# Patient Record
Sex: MALE | Race: WHITE | Employment: UNEMPLOYED | URBAN - METROPOLITAN AREA
[De-identification: names, ages, dates, MRNs, and addresses within clinical notes are randomized per-mention and may not be internally consistent; named-entity substitution may affect disease eponyms.]

---

## 2017-06-05 ENCOUNTER — APPOINTMENT (OUTPATIENT)
Dept: CT IMAGING | Age: 52
End: 2017-06-05
Attending: INTERNAL MEDICINE
Payer: SELF-PAY

## 2017-06-05 ENCOUNTER — HOSPITAL ENCOUNTER (EMERGENCY)
Age: 52
Discharge: HOME | End: 2017-06-05
Attending: INTERNAL MEDICINE
Payer: SELF-PAY

## 2017-06-05 ENCOUNTER — APPOINTMENT (OUTPATIENT)
Dept: GENERAL RADIOLOGY | Age: 52
End: 2017-06-05
Attending: INTERNAL MEDICINE
Payer: SELF-PAY

## 2017-06-05 VITALS
SYSTOLIC BLOOD PRESSURE: 111 MMHG | OXYGEN SATURATION: 100 % | HEART RATE: 71 BPM | BODY MASS INDEX: 24.38 KG/M2 | HEIGHT: 72 IN | DIASTOLIC BLOOD PRESSURE: 66 MMHG | WEIGHT: 180 LBS | TEMPERATURE: 97.9 F | RESPIRATION RATE: 16 BRPM
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
## 2017-06-05 DIAGNOSIS — M54.2 NECK PAIN: ICD-10-CM

## 2017-06-05 DIAGNOSIS — M54.50 ACUTE MIDLINE LOW BACK PAIN WITHOUT SCIATICA: ICD-10-CM

## 2017-06-05 DIAGNOSIS — R03.0 ELEVATED BLOOD PRESSURE READING: Primary | ICD-10-CM

## 2017-06-05 PROCEDURE — L0120 CERV FLEX N/ADJ FOAM PRE OTS: HCPCS

## 2017-06-05 PROCEDURE — 77030036549 HC CLLR CERV EMT SEMI-RID PREFB S2SG -B

## 2017-06-05 PROCEDURE — 99283 EMERGENCY DEPT VISIT LOW MDM: CPT

## 2017-06-05 PROCEDURE — 74011250637 HC RX REV CODE- 250/637: Performed by: PHYSICIAN ASSISTANT

## 2017-06-05 PROCEDURE — 72125 CT NECK SPINE W/O DYE: CPT

## 2017-06-05 PROCEDURE — 72170 X-RAY EXAM OF PELVIS: CPT

## 2017-06-05 PROCEDURE — 70450 CT HEAD/BRAIN W/O DYE: CPT

## 2017-06-05 RX ORDER — IBUPROFEN 600 MG/1
600 TABLET ORAL
Qty: 20 TAB | Refills: 0 | Status: SHIPPED | OUTPATIENT
Start: 2017-06-05 | End: 2022-01-12

## 2017-06-05 RX ORDER — CYCLOBENZAPRINE HCL 10 MG
10 TABLET ORAL
Qty: 15 TAB | Refills: 0 | OUTPATIENT
Start: 2017-06-05 | End: 2017-06-10

## 2017-06-05 RX ORDER — OXYCODONE AND ACETAMINOPHEN 5; 325 MG/1; MG/1
1 TABLET ORAL
Status: COMPLETED | OUTPATIENT
Start: 2017-06-05 | End: 2017-06-05

## 2017-06-05 RX ORDER — CYCLOBENZAPRINE HCL 10 MG
10 TABLET ORAL
Qty: 10 TAB | Refills: 0 | Status: SHIPPED | OUTPATIENT
Start: 2017-06-05 | End: 2022-02-25 | Stop reason: SDUPTHER

## 2017-06-05 RX ORDER — NAPROXEN 500 MG/1
500 TABLET ORAL 2 TIMES DAILY WITH MEALS
Qty: 14 TAB | Refills: 0 | OUTPATIENT
Start: 2017-06-05 | End: 2017-06-12

## 2017-06-05 RX ADMIN — OXYCODONE HYDROCHLORIDE AND ACETAMINOPHEN 1 TABLET: 5; 325 TABLET ORAL at 22:47

## 2017-06-06 NOTE — ED NOTES
Per Edward Johsn, order patient one PO percocet if patient has ride home. Patient stating he is not driving, he was dropped off by his neighbor who will take him home.

## 2017-06-06 NOTE — ED PROVIDER NOTES
HPI Comments: 8:20 PM Irene Salcedo is a 50 y.o. male who presents to the ED for evaluation post fall. Pt states that he was walking up his stairs when he slipped backwards and landed on his back. Pt c/o L sided lower back pain, neck pain, and HA. Pt denies LOC. Pt has no other sx or complaints. Currently denies of any SOB, chest pain, abd pain, numbness or tingling to lower ext. Past Medical History:   Diagnosis Date    Hypertension        Social History     Social History    Marital status:      Spouse name: N/A    Number of children: N/A    Years of education: N/A     Occupational History    Not on file. Social History Main Topics    Smoking status: Current Every Day Smoker     Packs/day: 1.00     Years: 30.00    Smokeless tobacco: Not on file    Alcohol use Yes    Drug use: No    Sexual activity: Not on file     Other Topics Concern    Not on file     Social History Narrative         ALLERGIES: Review of patient's allergies indicates no known allergies. Review of Systems   Constitutional: Negative for chills and fever. HENT: Negative for ear discharge, ear pain, facial swelling and hearing loss. Respiratory: Negative for cough, choking, chest tightness, shortness of breath and wheezing. Cardiovascular: Negative for chest pain, palpitations and leg swelling. Gastrointestinal: Negative for abdominal distention, abdominal pain, diarrhea, nausea, rectal pain and vomiting. Musculoskeletal: Positive for back pain and neck pain. Negative for arthralgias. Vitals:    06/05/17 2021 06/05/17 2236   BP: (!) 145/105 111/66   Pulse: 74 71   Resp: 12 16   Temp: 97.9 °F (36.6 °C)    SpO2: 96% 100%   Weight: 81.6 kg (180 lb)    Height: 6' (1.829 m)             Physical Exam   Constitutional: He is oriented to person, place, and time. He appears well-developed and well-nourished. No distress. HENT:   Head: Normocephalic and atraumatic.    Eyes: Conjunctivae and EOM are normal. Pupils are equal, round, and reactive to light. Neck:   Currently on C collar,   TTP around the cervical region and also with axial load. Cardiovascular: Normal rate and regular rhythm. Pulmonary/Chest: Effort normal and breath sounds normal.   Abdominal: Soft. Bowel sounds are normal.   Musculoskeletal:   SLR 30 * B/L  DTR + 2 B/L       Able to hyperextend finger on both hand against resistance.  intact b/l   Able to extend and flex wrist against resistance on both side. Neurological: He is alert and oriented to person, place, and time. Skin: Skin is warm. He is not diaphoretic. Psychiatric: He has a normal mood and affect. His behavior is normal. Judgment and thought content normal.        MDM  Number of Diagnoses or Management Options  Elevated blood pressure reading:   Diagnosis management comments: Pending X ray and CT official read. ED Course       Procedures    CLINICAL IMPRESSION:    1. Elevated blood pressure reading        11:21 PM REPORT GIVEN TO Dr Kelsey Fagan.

## 2017-06-06 NOTE — ED TRIAGE NOTES
Patient states he missed a step and fell on his butt and his head whipped back and c/o neck, head and left buttocks pain.

## 2017-06-06 NOTE — ED NOTES
Fall with left buttock pain; right paraspinal neck pain; occipital pain.  Will order x rays and fast track

## 2017-06-06 NOTE — ED NOTES
Pt stating he is still having significant pain in neck. Stating he feels \"stiff\" requesting pain medication at this time.  Oswald LANGFORD

## 2017-06-06 NOTE — DISCHARGE INSTRUCTIONS
Back Pain: Care Instructions  Your Care Instructions    Back pain has many possible causes. It is often related to problems with muscles and ligaments of the back. It may also be related to problems with the nerves, discs, or bones of the back. Moving, lifting, standing, sitting, or sleeping in an awkward way can strain the back. Sometimes you don't notice the injury until later. Arthritis is another common cause of back pain. Although it may hurt a lot, back pain usually improves on its own within several weeks. Most people recover in 12 weeks or less. Using good home treatment and being careful not to stress your back can help you feel better sooner. Follow-up care is a key part of your treatment and safety. Be sure to make and go to all appointments, and call your doctor if you are having problems. Its also a good idea to know your test results and keep a list of the medicines you take. How can you care for yourself at home? · Sit or lie in positions that are most comfortable and reduce your pain. Try one of these positions when you lie down:  ¨ Lie on your back with your knees bent and supported by large pillows. ¨ Lie on the floor with your legs on the seat of a sofa or chair. [place] on your side with your knees and hips bent and a pillow between your legs. ¨ Lie on your stomach if it does not make pain worse. · Do not sit up in bed, and avoid soft couches and twisted positions. Bed rest can help relieve pain at first, but it delays healing. Avoid bed rest after the first day of back pain. · Change positions every 30 minutes. If you must sit for long periods of time, take breaks from sitting. Get up and walk around, or lie in a comfortable position. · Try using a heating pad on a low or medium setting for 15 to 20 minutes every 2 or 3 hours. Try a warm shower in place of one session with the heating pad. · You can also try an ice pack for 10 to 15 minutes every 2 to 3 hours.  Put a thin cloth between the ice pack and your skin. · Take pain medicines exactly as directed. ¨ If the doctor gave you a prescription medicine for pain, take it as prescribed. ¨ If you are not taking a prescription pain medicine, ask your doctor if you can take an over-the-counter medicine. · Take short walks several times a day. You can start with 5 to 10 minutes, 3 or 4 times a day, and work up to longer walks. Walk on level surfaces and avoid hills and stairs until your back is better. · Return to work and other activities as soon as you can. Continued rest without activity is usually not good for your back. · To prevent future back pain, do exercises to stretch and strengthen your back and stomach. Learn how to use good posture, safe lifting techniques, and proper body mechanics. When should you call for help? Call your doctor now or seek immediate medical care if:  · You have new or worsening numbness in your legs. · You have new or worsening weakness in your legs. (This could make it hard to stand up.)  · You lose control of your bladder or bowels. Watch closely for changes in your health, and be sure to contact your doctor if:  · Your pain gets worse. · You are not getting better after 2 weeks. Where can you learn more? Go to http://allyssa-taylor.info/. Enter Y258 in the search box to learn more about \"Back Pain: Care Instructions. \"  Current as of: May 23, 2016  Content Version: 11.2  © 6471-4750 [employer]. Care instructions adapted under license by [employer] (which disclaims liability or warranty for this information). If you have questions about a medical condition or this instruction, always ask your healthcare professional. [place] [address] any warranty or liability for your use of this information.          Neck Pain: Care Instructions  Your Care Instructions  You can have neck pain anywhere from the bottom of your head to the top of your shoulders. It can spread to the upper back or arms. Injuries, painting a ceiling, sleeping with your neck twisted, staying in one position for too long, and many other activities can cause neck pain. Most neck pain gets better with home care. Your doctor may recommend medicine to relieve pain or relax your muscles. He or she may suggest exercise and physical therapy to increase flexibility and relieve stress. You may need to wear a special (cervical) collar to support your neck for a day or two. Follow-up care is a key part of your treatment and safety. Be sure to make and go to all appointments, and call your doctor if you are having problems. It's also a good idea to know your test results and keep a list of the medicines you take. How can you care for yourself at home? · Try using a heating pad on a low or medium setting for 15 to 20 minutes every 2 or 3 hours. Try a warm shower in place of one session with the heating pad. · You can also try an ice pack for 10 to 15 minutes every 2 to 3 hours. Put a thin cloth between the ice and your skin. · Take pain medicines exactly as directed. ¨ If the doctor gave you a prescription medicine for pain, take it as prescribed. ¨ If you are not taking a prescription pain medicine, ask your doctor if you can take an over-the-counter medicine. · If your doctor recommends a cervical collar, wear it exactly as directed. When should you call for help? Call your doctor now or seek immediate medical care if:  · You have new or worsening numbness in your arms, buttocks or legs. · You have new or worsening weakness in your arms or legs. (This could make it hard to stand up.)  · You lose control of your bladder or bowels. Watch closely for changes in your health, and be sure to contact your doctor if:  · Your neck pain is getting worse. · You are not getting better after 1 week. · You do not get better as expected. Where can you learn more?   Go to http://linda.info/. Enter [id number] in the search box to learn more about \"Neck Pain: Care Instructions. \"  Current as of: May 23, 2016  Content Version: 11.2  © 5597-3790 [employer]. Care instructions adapted under license by [employer] (which disclaims liability or warranty for this information). If you have questions about a medical condition or this instruction, always ask your healthcare professional. [address] any warranty or liability for your use of this information. Elevated Blood Pressure: Care Instructions  Your Care Instructions    Blood pressure is a measure of how hard the blood pushes against the walls of your arteries. It's normal for blood pressure to go up and down throughout the day. But if it stays up over time, you have high blood pressure. Two numbers tell you your blood pressure. The first number is the systolic pressure. It shows how hard the blood pushes when your heart is pumping. The second number is the diastolic pressure. It shows how hard the blood pushes between heartbeats, when your heart is relaxed and filling with blood. An ideal blood pressure in adults is less than 120/80 (say \"120 over 80\"). High blood pressure is 140/90 or higher. You have high blood pressure if your top number is 140 or higher or your bottom number is 90 or higher, or both. The main test for high blood pressure is simple, fast, and painless. To diagnose high blood pressure, your doctor will test your blood pressure at different times. After testing your blood pressure, your doctor may ask you to test it again when you are home. If you are diagnosed with high blood pressure, you can work with your doctor to make a long-term plan to manage it. Follow-up care is a key part of your treatment and safety. Be sure to make and go to all appointments, and call your doctor if you are having problems.  It's also a good idea to know your test results and keep a list of the medicines you take. How can you care for yourself at home? · Do not smoke. Smoking increases your risk for heart attack and stroke. If you need help quitting, talk to your doctor about stop-smoking programs and medicines. These can increase your chances of quitting for good. · Stay at a healthy weight. · Try to limit how much sodium you eat to less than 2,300 milligrams (mg) a day. Your doctor may ask you to try to eat less than 1,500 mg a day. · Be physically active. Get at least 30 minutes of exercise on most days of the week. Walking is a good choice. You also may want to do other activities, such as running, swimming, cycling, or playing tennis or team sports. · Avoid or limit alcohol. Talk to your doctor about whether you can drink any alcohol. · Eat plenty of fruits, vegetables, and low-fat dairy products. Eat less saturated and total fats. · Learn how to check your blood pressure at home. When should you call for help? Call your doctor now or seek immediate medical care if:  · Your blood pressure is much higher than normal (such as 180/110 or higher). · You think high blood pressure is causing symptoms such as:  ¨ Severe headache. ¨ Blurry vision. Watch closely for changes in your health, and be sure to contact your doctor if:  · You do not get better as expected. Where can you learn more? Go to http://allyssa-taylor.info/. Enter P683 in the search box to learn more about \"Elevated Blood Pressure: Care Instructions. \"  Current as of: October 19, 2016  Content Version: 11.2  © 6339-7935 [employer]. Care instructions adapted under license by [employer] (which disclaims liability or warranty for this information). If you have questions about a medical condition or this instruction, always ask your healthcare professional. [address] any warranty or liability for your use of this information.

## 2021-12-07 ENCOUNTER — OFFICE VISIT (OUTPATIENT)
Dept: FAMILY MEDICINE CLINIC | Age: 56
End: 2021-12-07
Payer: MEDICAID

## 2021-12-07 VITALS
HEART RATE: 110 BPM | TEMPERATURE: 98.5 F | BODY MASS INDEX: 22.67 KG/M2 | SYSTOLIC BLOOD PRESSURE: 104 MMHG | OXYGEN SATURATION: 94 % | RESPIRATION RATE: 16 BRPM | HEIGHT: 72 IN | DIASTOLIC BLOOD PRESSURE: 72 MMHG | WEIGHT: 167.4 LBS

## 2021-12-07 DIAGNOSIS — D64.9 ANEMIA, UNSPECIFIED TYPE: ICD-10-CM

## 2021-12-07 DIAGNOSIS — I49.9 IRREGULAR HEART BEAT: ICD-10-CM

## 2021-12-07 DIAGNOSIS — Z76.89 ENCOUNTER TO ESTABLISH CARE: ICD-10-CM

## 2021-12-07 DIAGNOSIS — K70.31 ALCOHOLIC CIRRHOSIS OF LIVER WITH ASCITES (HCC): Primary | ICD-10-CM

## 2021-12-07 PROCEDURE — 99204 OFFICE O/P NEW MOD 45 MIN: CPT | Performed by: STUDENT IN AN ORGANIZED HEALTH CARE EDUCATION/TRAINING PROGRAM

## 2021-12-07 RX ORDER — LACTULOSE 10 G/15ML
15 SOLUTION ORAL; RECTAL 3 TIMES DAILY
COMMUNITY
Start: 2021-11-14

## 2021-12-07 RX ORDER — CIPROFLOXACIN 500 MG/1
500 TABLET ORAL 2 TIMES DAILY
COMMUNITY
End: 2022-01-12

## 2021-12-07 RX ORDER — LANOLIN ALCOHOL/MO/W.PET/CERES
100 CREAM (GRAM) TOPICAL DAILY
COMMUNITY
Start: 2021-10-11 | End: 2022-01-27

## 2021-12-07 RX ORDER — SPIRONOLACTONE 100 MG/1
1 TABLET, FILM COATED ORAL DAILY
COMMUNITY
Start: 2021-10-27 | End: 2022-02-25 | Stop reason: SDUPTHER

## 2021-12-07 RX ORDER — ONDANSETRON 4 MG/1
TABLET, ORALLY DISINTEGRATING ORAL
COMMUNITY
Start: 2021-11-19

## 2021-12-07 RX ORDER — MULTIVITAMIN WITH FOLIC ACID 400 MCG
1 TABLET ORAL DAILY
COMMUNITY
Start: 2021-10-27 | End: 2022-01-12 | Stop reason: SDUPTHER

## 2021-12-07 RX ORDER — FUROSEMIDE 40 MG/1
40 TABLET ORAL DAILY
Qty: 30 TABLET | Refills: 0 | Status: SHIPPED | OUTPATIENT
Start: 2021-12-07 | End: 2022-02-16

## 2021-12-07 RX ORDER — FUROSEMIDE 40 MG/1
40 TABLET ORAL DAILY
COMMUNITY
End: 2021-12-07 | Stop reason: SDUPTHER

## 2021-12-07 RX ORDER — PANTOPRAZOLE SODIUM 40 MG/1
40 TABLET, DELAYED RELEASE ORAL DAILY
COMMUNITY
Start: 2021-11-14 | End: 2022-01-27

## 2021-12-07 NOTE — PROGRESS NOTES
Radha Elias is a 49 y.o.  male and presents with    Chief Complaint   Patient presents with    Establish Care           Subjective:    Pt is here to establish care. Alcohol drinker for about >30yrs. After abdominal swelling and bowel issues pt was admitted to [facility] on 10/6/21 w/ dx of decompensated alcoholic cirrhosis of liver with ascites - required several parencentesis   d/c on 10/21 - Went to [facility] from [facility] and got d/c on 10/28. Pt states that he is following with hepatologist Dr. Tk Angela at [facility]. He will be seeing him next on 1/26/22 for screening of possible liver tx. Called Dr. Tk Angela office at [facility] and spoke to his nurse. They were unaware that pt was on hospice. They state patient should be on Lasix, Spironolactone, Lactulose, PPI. Pt had then a new hospitalization at [facility] on 11/10/21 after having blood work done that showed hyperkalemia and elevated Cr. Admitted w/ [facility] likely d/t pre renal state, diuretic use, and possible hepatorenal syndrome. Prior to last d/c pt was - started hospice for liver cirrhosis and what it looked like a poor overall prognosis. Also, he was told to stop spironolactone at that time. Pt is taking Lactulose, Lasix, PPI, MVI, oxycodone, and lorazepam.    Hospice - [facility] - nurse comes every [phone]      There is no problem list on file for this patient. Past Medical History:   Diagnosis Date    Hypertension       No past surgical history on file. No family history on file.   Social History     Socioeconomic History    Marital status:      Spouse name: Not on file    Number of children: Not on file    Years of education: Not on file    Highest education level: Not on file   Occupational History    Not on file   Tobacco Use    Smoking status: Current Every Day Smoker     Packs/day: 1.00     Years: 30.00     Pack years: 30.00     Types: Cigarettes    Smokeless tobacco: Not on file   Substance and Sexual Activity  Alcohol use: Not Currently    Drug use: No    Sexual activity: Not on file   Other Topics Concern    Not on file   Social History Narrative    Not on file     Social Determinants of Health     Financial Resource Strain:     Difficulty of Paying Living Expenses: Not on file   Food Insecurity:     Worried About Running Out of Food in the Last Year: Not on file    [place] of Food in the Last Year: Not on file   Transportation Needs:     Lack of Transportation (Medical): Not on file    Lack of Transportation (Non-Medical): Not on file   Physical Activity:     Days of Exercise per Week: Not on file    Minutes of Exercise per Session: Not on file   Stress:     Feeling of Stress : Not on file   Social Connections:     Frequency of Communication with Friends and Family: Not on file    Frequency of Social Gatherings with Friends and Family: Not on file    Attends [employer] Services: Not on file    Active Member of [address] [place] [address] or Organizations: Not on file    Attends Club or Organization Meetings: Not on file    Marital Status: Not on file   Intimate Partner Violence:     Fear of Current or Ex-Partner: Not on file    Emotionally Abused: Not on file    Physically Abused: Not on file    Sexually Abused: Not on file   Housing Stability:     Unable to Pay for Housing in the Last Year: Not on file    Number of [place] in the Last Year: Not on file    Unstable Housing in the Last Year: Not on file        Current Outpatient Medications   Medication Sig Dispense Refill    ciprofloxacin HCl (Cipro) 500 mg tablet Take 500 mg by mouth two (2) times a day.  furosemide (LASIX) 40 mg tablet Take 40 mg by mouth daily.  cyclobenzaprine (FLEXERIL) 10 mg tablet Take 1 Tab by mouth three (3) times daily as needed for Muscle Spasm(s). (Patient not taking: Reported on 12/7/2021) 10 Tab 0    ibuprofen (MOTRIN) 600 mg tablet Take 1 Tab by mouth every six (6) hours as needed for Pain.  (Patient not taking: Reported on 12/7/2021) 20 Tab 0        ROS   Review of Systems   Constitutional: Positive for malaise/fatigue. Negative for chills and fever. HENT: Negative for congestion, ear discharge and ear pain. Eyes: Negative for blurred vision, pain and discharge. Respiratory: Negative for cough and shortness of breath. Cardiovascular: Negative for chest pain and palpitations. Gastrointestinal: Negative for abdominal pain, nausea and vomiting. Genitourinary: Negative for dysuria, frequency and urgency. Musculoskeletal: Positive for joint pain. Skin: Negative for itching and rash. Neurological: Negative for dizziness, seizures, loss of consciousness and headaches. Psychiatric/Behavioral: Negative for substance abuse. Objective:  Vitals:    12/07/21 0901   BP: 104/72   Pulse: (!) 110   Resp: 16   Temp: 98.5 °F (36.9 °C)   TempSrc: Temporal   SpO2: 94%   Weight: 167 lb 6.4 oz (75.9 kg)   Height: 6' (1.829 m)   PainSc:   7       Physical Exam  Vitals reviewed. Constitutional:       General: He is not in acute distress. Appearance: He is underweight. He is not ill-appearing. Comments: Pt is in a wheelchair. HENT:      Nose: Nose normal. No congestion or rhinorrhea. Eyes:      General: Scleral icterus present. Right eye: No discharge. Left eye: No discharge. Conjunctiva/sclera: Conjunctivae normal.   Cardiovascular:      Rate and Rhythm: Normal rate. Rhythm irregular. Pulses: Normal pulses. Pulmonary:      Effort: Pulmonary effort is normal.   Abdominal:      General: There is distension. Tenderness: There is no abdominal tenderness. Musculoskeletal:      Cervical back: Normal range of motion and neck supple. Skin:     Coloration: Skin is jaundiced. Neurological:      Mental Status: He is alert and oriented to person, place, and time. Psychiatric:         Speech: Speech is delayed. Behavior: Behavior is slowed. Behavior is cooperative. Thought Content: Thought content normal.         Judgment: Judgment normal.           LABS     TESTS      Assessment/Plan:    1. Alcoholic cirrhosis of liver with ascites (HCC)  - furosemide (LASIX) 40 mg tablet; Take 1 Tablet by mouth daily. Dispense: 30 Tablet; Refill: 0  - CBC WITH AUTOMATED DIFF; Future  - METABOLIC PANEL, COMPREHENSIVE; Future  - PTT; Future    2. Encounter to establish care  will be pt PCP    3. Anemia, unspecified type  2/2 cirrhosis    4. Irregular heart beat  - EKG, 12 LEAD, INITIAL; Future    Lab review: orders written for new lab studies as appropriate; see orders      I have discussed the diagnosis with the patient and the intended plan as seen in the above orders. The patient has received an after-visit summary and questions were answered concerning future plans. I have discussed medication side effects and warnings with the patient as well. I have reviewed the plan of care with the patient, accepted their input and they are in agreement with the treatment goals.          Benito Holstein, MD

## 2021-12-07 NOTE — PROGRESS NOTES
Nichelle Berrios is a 49 y.o. male (: 1965) presenting to address:    Chief Complaint   Patient presents with   [facility].   Patient DECLINED flu vaccine    There were no vitals filed for this visit. Hearing/Vision:   No exam data present    Learning Assessment:   No flowsheet data found. Depression Screening:     3 most recent PHQ Screens 2021   Little interest or pleasure in doing things More than half the days   Feeling down, depressed, irritable, or hopeless Nearly every day   Total Score PHQ 2 5   Trouble falling or staying asleep, or sleeping too much Not at all     Fall Risk Assessment:     Fall Risk Assessment, last 12 mths 2021   Able to walk? Yes   Fall in past 12 months? 0   Do you feel unsteady? 1   Are you worried about falling 1   Is TUG test greater than 12 seconds? 0   Is the gait abnormal? 1     Abuse Screening:   No flowsheet data found. ADL Assessment:   No flowsheet data found. Coordination of Care Questionaire:   1. Have you been to the ER, urgent care clinic since your last visit? Hospitalized since your last visit? NO    2. Have you seen or consulted any other health care providers outside of the [address] [place] [address] since your last visit? Include any pap smears or colon screening. NO    Advanced Directive:   1. Do you have an Advanced Directive? NO    2. Would you like information on Advanced Directives?  NO

## 2022-01-12 ENCOUNTER — OFFICE VISIT (OUTPATIENT)
Dept: FAMILY MEDICINE CLINIC | Age: 57
End: 2022-01-12
Payer: MEDICAID

## 2022-01-12 VITALS
HEIGHT: 72 IN | OXYGEN SATURATION: 97 % | HEART RATE: 95 BPM | SYSTOLIC BLOOD PRESSURE: 108 MMHG | TEMPERATURE: 99.1 F | WEIGHT: 153.6 LBS | DIASTOLIC BLOOD PRESSURE: 78 MMHG | RESPIRATION RATE: 16 BRPM | BODY MASS INDEX: 20.8 KG/M2

## 2022-01-12 DIAGNOSIS — L89.41 PRESSURE INJURY OF CONTIGUOUS REGION INVOLVING BUTTOCK AND HIP, STAGE 1, UNSPECIFIED LATERALITY: Primary | ICD-10-CM

## 2022-01-12 DIAGNOSIS — R53.1 WEAKNESS GENERALIZED: ICD-10-CM

## 2022-01-12 DIAGNOSIS — F32.1 CURRENT MODERATE EPISODE OF MAJOR DEPRESSIVE DISORDER WITHOUT PRIOR EPISODE (HCC): ICD-10-CM

## 2022-01-12 DIAGNOSIS — K70.31 ALCOHOLIC CIRRHOSIS OF LIVER WITH ASCITES (HCC): ICD-10-CM

## 2022-01-12 PROCEDURE — 99214 OFFICE O/P EST MOD 30 MIN: CPT | Performed by: STUDENT IN AN ORGANIZED HEALTH CARE EDUCATION/TRAINING PROGRAM

## 2022-01-12 RX ORDER — OSTOMY SUPPLY 1"
1 EACH MISCELLANEOUS DAILY
Qty: 10 EACH | Refills: 1 | Status: SHIPPED | OUTPATIENT
Start: 2022-01-12

## 2022-01-12 RX ORDER — ZINC OXIDE 200 MG/G
OINTMENT TOPICAL AS NEEDED
Qty: 60 G | Refills: 0 | Status: SHIPPED | OUTPATIENT
Start: 2022-01-12

## 2022-01-12 RX ORDER — SENNOSIDES/DOCUSATE SODIUM 8.6MG-50MG
TABLET ORAL
COMMUNITY
Start: 2022-01-07

## 2022-01-12 RX ORDER — ESCITALOPRAM OXALATE 5 MG/1
5 TABLET ORAL DAILY
Qty: 30 TABLET | Refills: 0 | Status: SHIPPED | OUTPATIENT
Start: 2022-01-12 | End: 2022-02-10

## 2022-01-12 RX ORDER — MULTIVITAMIN WITH FOLIC ACID 400 MCG
1 TABLET ORAL DAILY
Qty: 90 TABLET | Refills: 1 | Status: SHIPPED | OUTPATIENT
Start: 2022-01-12

## 2022-01-12 NOTE — PROGRESS NOTES
Patient is accompanied by Mainor Marley, roommate. Patient assisted by wheelchair. Chief Complaint   Patient presents with    Follow-up    Weight Loss     1. Have you been to the ER, urgent care clinic since your last visit? Hospitalized since your last visit? Yes SN[facility] last month for paracentesis     2. Have you seen or consulted any other health care providers outside of the [address] [place] [address] since your last visit? Include any pap smears or colon screening.  No    Health Maintenance Due   Topic Date Due    Hepatitis C Screening  Never done    COVID-19 Vaccine (1) Never done    Pneumococcal 0-64 years (1 of 2 - PPSV23) Never done    Lipid Screen  Never done    Colorectal Cancer Screening Combo  Never done    Shingrix Vaccine Age 50> (1 of 2) Never done    Low dose CT lung screening  Never done    Flu Vaccine (1) Never done

## 2022-01-14 NOTE — PROGRESS NOTES
Vernon Anderson is a 49 y.o.  male and presents with    Chief Complaint   Patient presents with    Follow-up    Weight Loss           Subjective:    Pt is in hospice. Recently had a paracentesis where he had 5L removed. Pt states that he is not feeling like he wants to eat. He gets cravings for food but when he goes to eat it he only wants a few bites. Feels like he has generalized weakness and sometimes is not even strong enough to get up from his chair. States he also is starting to have a wound on his sacrum. His roommates have been trying to patch it up so this does not become a sore. He states hospice has not come around and seen this yet, but they will be there tomorrow. Upon further discussion with pt he does feel like he is starting to get further depressed. He does not like the fact that he cannot do things that he used to. Pt states he has upcoming apt w/ GI in [place] for colonoscopy. There is no problem list on file for this patient. Past Medical History:   Diagnosis Date    Hypertension       History reviewed. No pertinent surgical history. History reviewed. No pertinent family history.   Social History     Socioeconomic History    Marital status:      Spouse name: Not on file    Number of children: Not on file    Years of education: Not on file    Highest education level: Not on file   Occupational History    Not on file   Tobacco Use    Smoking status: Current Every Day Smoker     Packs/day: 1.00     Years: 30.00     Pack years: 30.00     Types: Cigarettes    Smokeless tobacco: Never Used   Vaping Use    Vaping Use: Never used   Substance and Sexual Activity    Alcohol use: Not Currently    Drug use: No    Sexual activity: Not on file   Other Topics Concern    Not on file   Social History Narrative    Not on file     Social Determinants of Health     Financial Resource Strain:     Difficulty of Paying Living Expenses: Not on file   Food Insecurity:     Worried About Running Out of Food in the Last Year: Not on file    [place] of Food in the Last Year: Not on file   Transportation Needs:     Lack of Transportation (Medical): Not on file    Lack of Transportation (Non-Medical): Not on file   Physical Activity:     Days of Exercise per Week: Not on file    Minutes of Exercise per Session: Not on file   Stress:     Feeling of Stress : Not on file   Social Connections:     Frequency of Communication with Friends and Family: Not on file    Frequency of Social Gatherings with Friends and Family: Not on file    Attends Anabaptism Services: Not on file    Active Member of [address] [place] [address] [employer] or Organizations: Not on file    Attends Club or Organization Meetings: Not on file    Marital Status: Not on file   Intimate Partner Violence:     Fear of Current or Ex-Partner: Not on file    Emotionally Abused: Not on file    Physically Abused: Not on file    Sexually Abused: Not on file   Housing Stability:     Unable to Pay for Housing in the Last Year: Not on file    Number of [place] in the Last Year: Not on file    Unstable Housing in the Last Year: Not on file        Current Outpatient Medications   Medication Sig Dispense Refill    Stool Softener-Laxative 8.6-50 mg per tablet TAKE ONE TO TWO TABLETS BY MOUTH AS NEEDED FOR CONSTIPATION      liver oil-zinc oxide ointment Apply  to affected area as needed for Skin Irritation. 60 g 0    Alginate Dressing (Kaltostat Wound Dressing) 6 X 9 1/2 \" bndg 1 Each by Apply Externally route daily. 10 Each 1    Daily-Teressa, with folic acid, 081 mcg tab tablet Take 1 Tablet by mouth daily. 90 Tablet 1    escitalopram oxalate (LEXAPRO) 5 mg tablet Take 1 Tablet by mouth daily. 30 Tablet 0    lactulose (CHRONULAC) 10 gram/15 mL solution Take 15 mL by mouth three (3) times daily.       ondansetron (ZOFRAN ODT) 4 mg disintegrating tablet USE 1 TABLET UNDER TONGUE EVERY 8 HOURS AS NEEDED FOR NAUSEA      pantoprazole (PROTONIX) 40 mg tablet Take 40 mg by mouth daily.  spironolactone (ALDACTONE) 100 mg tablet Take 1 Tablet by mouth daily.  thiamine HCL (B-1) 100 mg tablet Take 100 mg by mouth daily.  furosemide (LASIX) 40 mg tablet Take 1 Tablet by mouth daily. 30 Tablet 0    cyclobenzaprine (FLEXERIL) 10 mg tablet Take 1 Tab by mouth three (3) times daily as needed for Muscle Spasm(s). 10 Tab 0        ROS   Review of Systems   Constitutional: Positive for malaise/fatigue and weight loss. Negative for chills and fever. HENT: Negative for congestion, ear discharge and ear pain. Eyes: Negative for blurred vision, pain and discharge. Respiratory: Negative for cough and shortness of breath. Cardiovascular: Negative for chest pain and palpitations. Gastrointestinal: Negative for abdominal pain, nausea and vomiting. Genitourinary: Negative for dysuria, frequency and urgency. Skin: Negative for itching and rash. Neurological: Positive for weakness. Negative for dizziness, seizures, loss of consciousness and headaches. Psychiatric/Behavioral: Positive for depression. Negative for substance abuse. Objective:  Vitals:    01/12/22 1520   BP: 108/78   Pulse: 95   Resp: 16   Temp: 99.1 °F (37.3 °C)   TempSrc: Temporal   SpO2: 97%   Weight: 153 lb 9.6 oz (69.7 kg)   Height: 6' (1.829 m)   PainSc:   0 - No pain       Physical Exam  Constitutional:       General: He is not in acute distress. Appearance: Normal appearance. He is not ill-appearing. HENT:      Nose: Nose normal. No congestion or rhinorrhea. Eyes:      General:         Right eye: No discharge. Left eye: No discharge. Conjunctiva/sclera: Conjunctivae normal.   Cardiovascular:      Rate and Rhythm: Normal rate and regular rhythm. Pulses: Normal pulses. Pulmonary:      Effort: Pulmonary effort is normal.      Breath sounds: Normal breath sounds.    Musculoskeletal:      Cervical back: Normal range of motion and neck supple. Skin:     Coloration: Skin is jaundiced and pale. Neurological:      Mental Status: He is alert and oriented to person, place, and time. Psychiatric:         Mood and Affect: Mood is depressed. Affect is tearful. Behavior: Behavior normal.         Judgment: Judgment normal.           LABS     TESTS      Assessment/Plan:    1. Pressure injury of contiguous region involving buttock and hip, stage 1, unspecified laterality  - liver oil-zinc oxide ointment; Apply  to affected area as needed for Skin Irritation. Dispense: 60 g; Refill: 0  - Alginate Dressing (Kaltostat Wound Dressing) 6 X 9 1/2 \" bndg; 1 Each by Apply Externally route daily. Dispense: 10 Each; Refill: 1    2. Alcoholic cirrhosis of liver with ascites ([address]  Will get nutrition so that pt gets the right nutrition that he requires  Denies any alcohol drinking  - REFERRAL TO NUTRITION  - Daily-Teressa, with folic acid, 440 mcg tab tablet; Take 1 Tablet by mouth daily. Dispense: 90 Tablet; Refill: 1    3. Weakness generalized  Likely 2/2 him not eating. Will see nutritionist. Also, likely d/t pt having depression    4. Current moderate episode of major depressive disorder without prior episode (HCC)  - escitalopram oxalate (LEXAPRO) 5 mg tablet; Take 1 Tablet by mouth daily. Dispense: 30 Tablet; Refill: 0       Lab review: no lab studies available for review at time of visit      I have discussed the diagnosis with the patient and the intended plan as seen in the above orders. The patient has received an after-visit summary and questions were answered concerning future plans. I have discussed medication side effects and warnings with the patient as well. I have reviewed the plan of care with the patient, accepted their input and they are in agreement with the treatment goals.          Rogelio Stacy MD

## 2022-01-27 RX ORDER — PANTOPRAZOLE SODIUM 40 MG/1
TABLET, DELAYED RELEASE ORAL
Qty: 30 TABLET | Refills: 0 | Status: SHIPPED | OUTPATIENT
Start: 2022-01-27 | End: 2022-03-01 | Stop reason: SDUPTHER

## 2022-01-27 RX ORDER — FOLIC ACID 1 MG/1
TABLET ORAL
Qty: 30 TABLET | Refills: 0 | Status: SHIPPED | OUTPATIENT
Start: 2022-01-27 | End: 2022-03-01 | Stop reason: SDUPTHER

## 2022-01-27 RX ORDER — LANOLIN ALCOHOL/MO/W.PET/CERES
CREAM (GRAM) TOPICAL
Qty: 30 TABLET | Refills: 0 | Status: SHIPPED | OUTPATIENT
Start: 2022-01-27 | End: 2022-03-01 | Stop reason: SDUPTHER

## 2022-02-10 ENCOUNTER — VIRTUAL VISIT (OUTPATIENT)
Dept: FAMILY MEDICINE CLINIC | Age: 57
End: 2022-02-10
Payer: MEDICAID

## 2022-02-10 DIAGNOSIS — F32.1 CURRENT MODERATE EPISODE OF MAJOR DEPRESSIVE DISORDER WITHOUT PRIOR EPISODE (HCC): ICD-10-CM

## 2022-02-10 DIAGNOSIS — K70.31 ALCOHOLIC CIRRHOSIS OF LIVER WITH ASCITES (HCC): Primary | ICD-10-CM

## 2022-02-10 DIAGNOSIS — G31.2 ALCOHOLIC ENCEPHALOPATHY (HCC): ICD-10-CM

## 2022-02-10 PROCEDURE — 99213 OFFICE O/P EST LOW 20 MIN: CPT | Performed by: FAMILY MEDICINE

## 2022-02-10 RX ORDER — ESCITALOPRAM OXALATE 5 MG/1
5 TABLET ORAL DAILY
Qty: 30 TABLET | Refills: 3 | Status: SHIPPED | OUTPATIENT
Start: 2022-02-10

## 2022-02-10 NOTE — PROGRESS NOTES
Rachel Drake presents today for   Chief Complaint   Patient presents with    Depression    Cirrhosis Of Liver       Is someone accompanying this pt? na    Is the patient using any DME equipment during OV? na    Depression Screening:  3 most recent PHQ Screens 1/12/2022   Little interest or pleasure in doing things Not at all   Feeling down, depressed, irritable, or hopeless Several days   Total Score PHQ 2 1   Trouble falling or staying asleep, or sleeping too much -       Learning Assessment:  No flowsheet data found. Abuse Screening:  No flowsheet data found. Fall Risk  Fall Risk Assessment, last 12 mths 12/7/2021   Able to walk? Yes   Fall in past 12 months? 0   Do you feel unsteady? 1   Are you worried about falling 1   Is TUG test greater than 12 seconds? 0   Is the gait abnormal? 1       Health Maintenance reviewed and discussed and ordered per Provider. Health Maintenance Due   Topic Date Due    Hepatitis C Screening  Never done    Pneumococcal 0-64 years (1 of 2 - PPSV23) Never done    Lipid Screen  Never done    Colorectal Cancer Screening Combo  Never done    Shingrix Vaccine Age 50> (1 of 2) Never done    Low dose CT lung screening  Never done    Flu Vaccine (1) Never done    COVID-19 Vaccine (2 - Pfizer 3-dose series) 11/17/2021   . Coordination of Care:  1. Have you been to the ER, urgent care clinic since your last visit? Hospitalized since your last visit? no    2. Have you seen or consulted any other health care providers outside of the [address] [place] [address] since your last visit? Include any pap smears or colon screening.  no      Last  Checked na  Last UDS Checked na  Last Pain contract signed: na    Patients concerns today:  Med refills

## 2022-02-12 DIAGNOSIS — K70.31 ALCOHOLIC CIRRHOSIS OF LIVER WITH ASCITES (HCC): ICD-10-CM

## 2022-02-16 RX ORDER — FUROSEMIDE 40 MG/1
TABLET ORAL
Qty: 30 TABLET | Refills: 0 | Status: SHIPPED | OUTPATIENT
Start: 2022-02-16

## 2022-03-01 NOTE — TELEPHONE ENCOUNTER
Lina Cid, patient's friend/care giver, called to request medication refills for the following:  Pantoprazole, Folic Acid and Thiamine HCL. Radha Mendoza would like to know if Dr. Nicola Lacy can also prescribe the following medications which he received when d/c from the hospital:  Midodrine 25 mg and Spironolactone 100 mg? She does not know how long he has been out of this medication. Also, she mentioned that patient has liver failure and needs these medications ASAP. Dr. Nicola Lacy knows patient's history and has a list of all medications.   Please assist.

## 2022-03-02 RX ORDER — PANTOPRAZOLE SODIUM 40 MG/1
40 TABLET, DELAYED RELEASE ORAL DAILY
Qty: 30 TABLET | Refills: 2 | Status: SHIPPED | OUTPATIENT
Start: 2022-03-02

## 2022-03-02 RX ORDER — FOLIC ACID 1 MG/1
1 TABLET ORAL DAILY
Qty: 30 TABLET | Refills: 2 | Status: SHIPPED | OUTPATIENT
Start: 2022-03-02

## 2022-03-02 RX ORDER — LANOLIN ALCOHOL/MO/W.PET/CERES
100 CREAM (GRAM) TOPICAL DAILY
Qty: 30 TABLET | Refills: 2 | Status: SHIPPED | OUTPATIENT
Start: 2022-03-02

## 2023-03-21 NOTE — PROGRESS NOTES
Felicia Trejo is a 49 y.o.  male and presents with    Chief Complaint   Patient presents with    Depression    Cirrhosis Of Liver     Chip Jeanie Coates, who was evaluated through a synchronous (real-time) audio-video encounter, and/or his healthcare decision maker, is aware that it is a billable service, which includes applicable co-pays, with coverage as determined by his insurance carrier. He provided verbal consent to proceed and patient identification was verified. This visit was conducted pursuant to the emergency declaration under the [facility], [address] [place] [address] authority and the [employer] Act. A caregiver was present when appropriate. Ability to conduct physical exam was limited. The patient was located at home in a state where the provider was licensed to provide care. --Kelvin Bishop MD on 2/10/2022 at 3:43 PM    Subjective:  He is following up for depression and cirrhosis. He was scheduled to go to liver specialist last month for colonoscopy and endoscopy and nutritionist but he went to the ER due to ascites in stead. He underwent paracentesis at that time. He is now in need of paracentesis again. He is taking lactulose as prescribed. Depression Review:  Patient is seen for followup of depression. Treatment includes nothing even though he was prescribed  and no other therapies. Ongoing symptoms include depressed mood, anhedonia, psychomotor agitation, difficulty concentrating, hopelessness and impaired memory. He denies recurrent thoughts of death. He experiences the following side effects from the treatment: none because he never started the medication. ROS   Constitutional: Positive for malaise/fatigue and weight loss. Negative for chills and fever. HENT: Negative for congestion, ear discharge and ear pain. Eyes: Negative for blurred vision, pain and discharge.    Respiratory: Negative for cough and shortness of breath. Cardiovascular: Negative for chest pain and palpitations. Gastrointestinal: Negative for abdominal pain, nausea and vomiting. Genitourinary: Negative for dysuria, frequency and urgency. Skin: Negative for itching and rash. Neurological: Positive for weakness. Negative for dizziness, seizures, loss of consciousness and headaches. Psychiatric/Behavioral: Positive for depression. Negative for substance abuse. All other systems reviewed and are negative. Objective: There were no vitals filed for this visit. oriented to person, place, and time, overweight and chronically ill appearing  Mental status - depressed mood  Chest - normal work of breathing  Neurological - cranial nerves II through XII intact    LABS     TESTS      Assessment/Plan:    1. Current moderate episode of major depressive disorder without prior episode ([facility] [address]  Ensure pt has medication to take for depression  - escitalopram oxalate (LEXAPRO) 5 mg tablet; Take 1 Tablet by mouth daily. Dispense: 30 Tablet; Refill: 3    2. Alcoholic cirrhosis of liver with ascites ([facility] [address]  F/u with hepatologist    3. Alcoholic encephalopathy ([facility] [address] [facility]  Pt has not has been sober for 5 months      Lab review: no lab studies available for review at time of visit      I have discussed the diagnosis with the patient and the intended plan as seen in the above orders. I have discussed medication side effects and warnings with the patient as well. I have reviewed the plan of care with the patient, accepted their input and they are in agreement with the treatment goals. AROM